# Patient Record
Sex: MALE | Race: WHITE
[De-identification: names, ages, dates, MRNs, and addresses within clinical notes are randomized per-mention and may not be internally consistent; named-entity substitution may affect disease eponyms.]

---

## 2021-06-22 ENCOUNTER — HOSPITAL ENCOUNTER (EMERGENCY)
Dept: HOSPITAL 49 - FER | Age: 51
Discharge: OTHER INSTITUTION | End: 2021-06-22
Payer: COMMERCIAL

## 2021-06-22 DIAGNOSIS — J18.9: ICD-10-CM

## 2021-06-22 DIAGNOSIS — Z88.2: ICD-10-CM

## 2021-06-22 DIAGNOSIS — F17.200: ICD-10-CM

## 2021-06-22 DIAGNOSIS — Z20.822: ICD-10-CM

## 2021-06-22 DIAGNOSIS — N17.9: ICD-10-CM

## 2021-06-22 DIAGNOSIS — A41.9: Primary | ICD-10-CM

## 2021-06-22 LAB
ALBUMIN SERPL-MCNC: 2.4 G/DL (ref 3.4–5)
ALKALINE PHOSHATASE: 44 U/L (ref 46–116)
ALT SERPL-CCNC: 59 U/L (ref 16–63)
AST: 143 U/L (ref 15–37)
BASOPHIL: 1 % (ref 0–2)
BILIRUBIN - TOTAL: 0.3 MG/DL (ref 0.2–1)
BUN SERPL-MCNC: 58 MG/DL (ref 7–18)
BUN/CREAT RATIO (CALC): 8.9 RATIO
CHLORIDE: 93 MMOL/L (ref 98–107)
CO2 (BICARBONATE): 16 MMOL/L (ref 21–32)
CORONAVIRUS 2019 SARS-COV-2: NEGATIVE
CREATININE: 6.5 MG/DL (ref 0.67–1.17)
EOSINOPHIL: 0 % (ref 0–5)
GLOBULIN (CALCULATION): 4.5 G/DL
GLUCOSE SERPL-MCNC: 163 MG/DL (ref 74–106)
HCT: 39.1 % (ref 42–52)
HGB BLD-MCNC: 13.9 G/DL (ref 13.2–18)
INFLUENZA A NAA: NEGATIVE
LACTIC ACID: 7.6 MMOL/L (ref 0.4–1.9)
LYMPHOCYTE: 5.8 % (ref 15–48)
MCH RBC QN AUTO: 33.6 PG (ref 25–31)
MCHC RBC AUTO-ENTMCNC: 35.5 G/DL (ref 32–36)
MCV: 94.4 FL (ref 78–100)
MONOCYTE: 1.8 % (ref 0–12)
MPV: 11 FL (ref 6–9.5)
NEUTROPHIL: 90.6 % (ref 41–80)
NRBC: 0
PLT: 194 K/UL (ref 150–400)
POTASSIUM: 3.3 MMOL/L (ref 3.5–5.1)
RBC MORPHOLOGY: NORMAL
RBC: 4.14 M/UL (ref 4.7–6)
RDW: 14.3 % (ref 11.5–14)
TOTAL PROTEIN: 6.9 G/DL (ref 6.4–8.2)
WBC: 5 K/UL (ref 4–10.5)

## 2021-06-22 PROCEDURE — U0002 COVID-19 LAB TEST NON-CDC: HCPCS

## 2022-01-29 ENCOUNTER — HOSPITAL ENCOUNTER (EMERGENCY)
Dept: HOSPITAL 49 - FER | Age: 52
Discharge: HOME | End: 2022-01-29
Payer: COMMERCIAL

## 2022-01-29 DIAGNOSIS — Z79.899: ICD-10-CM

## 2022-01-29 DIAGNOSIS — R10.32: Primary | ICD-10-CM

## 2022-01-29 DIAGNOSIS — I10: ICD-10-CM
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
## 2022-01-29 DIAGNOSIS — Z88.2: ICD-10-CM
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
## 2022-04-05 ENCOUNTER — HOSPITAL ENCOUNTER (EMERGENCY)
Dept: HOSPITAL 49 - FER | Age: 52
Discharge: HOME | End: 2022-04-05
Payer: COMMERCIAL

## 2022-04-05 DIAGNOSIS — I12.9: ICD-10-CM

## 2022-04-05 DIAGNOSIS — Z88.2: ICD-10-CM

## 2022-04-05 DIAGNOSIS — F17.200: ICD-10-CM

## 2022-04-05 DIAGNOSIS — N18.9: ICD-10-CM

## 2022-04-05 DIAGNOSIS — E11.22: ICD-10-CM

## 2022-04-05 DIAGNOSIS — R30.0: Primary | ICD-10-CM

## 2022-04-05 LAB
BACTERIA: (no result)
BILIRUBIN: NEGATIVE MG/DL
BLOOD: NEGATIVE ERY/UL
CLARITY UR: CLEAR
COLOR: YELLOW
GLUCOSE (U): NORMAL MG/DL
GRAN CASTS #/AREA URNS LPF: (no result) /[LPF]
LEUKOCYTES: NEGATIVE LEU/UL
NITRITE: NEGATIVE MG/DL
PROTEIN: (no result) MG/DL
SPECIFIC GRAVITY: >=1.03 (ref 1–1.03)
SQUAMOUS EPITHELIAL CELLS: (no result)
URINARY RBC: (no result)
URINARY WBC: (no result)
UROBILINOGEN: 0.2 MG/DL (ref 0.2–1)

## 2022-04-14 ENCOUNTER — HOSPITAL ENCOUNTER (EMERGENCY)
Dept: HOSPITAL 49 - FER | Age: 52
Discharge: HOME | End: 2022-04-14
Payer: COMMERCIAL

## 2022-04-14 DIAGNOSIS — M87.9: Primary | ICD-10-CM

## 2022-04-14 DIAGNOSIS — F17.210: ICD-10-CM

## 2022-04-14 DIAGNOSIS — M47.26: ICD-10-CM

## 2022-04-14 DIAGNOSIS — Z88.2: ICD-10-CM

## 2022-04-14 DIAGNOSIS — I10: ICD-10-CM

## 2022-05-15 ENCOUNTER — HOSPITAL ENCOUNTER (EMERGENCY)
Dept: HOSPITAL 49 - FER | Age: 52
Discharge: HOME | End: 2022-05-15
Payer: COMMERCIAL

## 2022-05-15 DIAGNOSIS — F17.210: ICD-10-CM

## 2022-05-15 DIAGNOSIS — M25.552: Primary | ICD-10-CM

## 2022-05-15 DIAGNOSIS — Z79.899: ICD-10-CM

## 2022-05-15 DIAGNOSIS — I10: ICD-10-CM

## 2022-05-15 DIAGNOSIS — Z88.2: ICD-10-CM

## 2022-06-08 ENCOUNTER — HOSPITAL ENCOUNTER (OUTPATIENT)
Dept: HOSPITAL 49 - FAS | Age: 52
Discharge: HOME | End: 2022-06-08
Attending: ORTHOPAEDIC SURGERY
Payer: COMMERCIAL

## 2022-06-08 VITALS — HEIGHT: 68 IN | WEIGHT: 164.99 LBS | BODY MASS INDEX: 25.01 KG/M2

## 2022-06-08 DIAGNOSIS — Z79.899: ICD-10-CM

## 2022-06-08 DIAGNOSIS — Z88.2: ICD-10-CM

## 2022-06-08 DIAGNOSIS — F17.200: ICD-10-CM

## 2022-06-08 DIAGNOSIS — M16.12: Primary | ICD-10-CM

## 2022-06-08 DIAGNOSIS — M87.859: ICD-10-CM

## 2022-06-08 DIAGNOSIS — I10: ICD-10-CM

## 2022-06-08 LAB
BUN SERPL-MCNC: 21 MG/DL (ref 7–18)
BUN/CREAT RATIO (CALC): 24.7 RATIO
CHLORIDE: 106 MMOL/L (ref 98–107)
CO2 (BICARBONATE): 24 MMOL/L (ref 21–32)
CREATININE: 0.85 MG/DL (ref 0.67–1.17)
GLUCOSE SERPL-MCNC: 172 MG/DL (ref 74–106)
POTASSIUM: 3.4 MMOL/L (ref 3.5–5.1)

## 2022-06-19 ENCOUNTER — HOSPITAL ENCOUNTER (EMERGENCY)
Dept: HOSPITAL 49 - FER | Age: 52
Discharge: HOME | End: 2022-06-19
Payer: COMMERCIAL

## 2022-06-19 DIAGNOSIS — N18.9: ICD-10-CM

## 2022-06-19 DIAGNOSIS — F17.210: ICD-10-CM

## 2022-06-19 DIAGNOSIS — M87.9: Primary | ICD-10-CM

## 2022-06-19 DIAGNOSIS — I13.0: ICD-10-CM

## 2022-06-19 DIAGNOSIS — I50.9: ICD-10-CM

## 2022-06-19 DIAGNOSIS — Z79.899: ICD-10-CM

## 2022-07-26 ENCOUNTER — HOSPITAL ENCOUNTER
Dept: HOSPITAL 49 - FAS | Age: 52
LOS: 2 days | Discharge: HOME | End: 2022-07-28
Attending: ORTHOPAEDIC SURGERY | Admitting: ORTHOPAEDIC SURGERY
Payer: COMMERCIAL

## 2022-07-26 VITALS — HEIGHT: 68.11 IN | WEIGHT: 171.06 LBS | BODY MASS INDEX: 25.92 KG/M2

## 2022-07-26 DIAGNOSIS — F17.210: ICD-10-CM

## 2022-07-26 DIAGNOSIS — F32.A: ICD-10-CM

## 2022-07-26 DIAGNOSIS — Z88.2: ICD-10-CM

## 2022-07-26 DIAGNOSIS — I10: ICD-10-CM

## 2022-07-26 DIAGNOSIS — M16.12: Primary | ICD-10-CM

## 2022-07-26 DIAGNOSIS — E11.9: ICD-10-CM

## 2022-07-26 DIAGNOSIS — M87.852: ICD-10-CM

## 2022-07-26 PROCEDURE — 0054T BONE SRGRY CMPTR FLUOR IMAGE: CPT

## 2022-07-26 PROCEDURE — C1776 JOINT DEVICE (IMPLANTABLE): HCPCS

## 2022-07-26 PROCEDURE — G0378 HOSPITAL OBSERVATION PER HR: HCPCS

## 2022-07-26 PROCEDURE — 27130 TOTAL HIP ARTHROPLASTY: CPT

## 2022-07-27 LAB
BASOPHIL: 0.4 % (ref 0–2)
BILIRUBIN: NEGATIVE MG/DL
BLOOD: NEGATIVE ERY/UL
BUN SERPL-MCNC: 18 MG/DL (ref 7–18)
BUN/CREAT RATIO (CALC): 20 RATIO
CHLORIDE: 104 MMOL/L (ref 98–107)
CLARITY UR: CLEAR
CO2 (BICARBONATE): 22 MMOL/L (ref 21–32)
COLOR: YELLOW
CREATININE: 0.9 MG/DL (ref 0.67–1.17)
EOSINOPHIL: 2 % (ref 0–5)
GLUCOSE (U): NORMAL MG/DL
GLUCOSE SERPL-MCNC: 149 MG/DL (ref 74–106)
HCT: 31.7 % (ref 42–52)
HGB BLD-MCNC: 10 G/DL (ref 13.2–18)
LEUKOCYTES: NEGATIVE LEU/UL
LYMPHOCYTE: 10.8 % (ref 15–48)
MCH RBC QN AUTO: 30.4 PG (ref 25–31)
MCHC RBC AUTO-ENTMCNC: 31.5 G/DL (ref 32–36)
MCV: 96.4 FL (ref 78–100)
MONOCYTE: 12.7 % (ref 0–12)
MPV: 9.9 FL (ref 6–9.5)
NEUTROPHIL: 73.7 % (ref 41–80)
NITRITE: NEGATIVE MG/DL
NRBC: 0
PLT: 201 K/UL (ref 150–400)
POTASSIUM: 4.1 MMOL/L (ref 3.5–5.1)
PROTEIN: NEGATIVE MG/DL
RBC MORPHOLOGY: NORMAL
RBC: 3.29 M/UL (ref 4.7–6)
RDW: 15.1 % (ref 11.5–14)
SPECIFIC GRAVITY: 1.02 (ref 1–1.03)
UROBILINOGEN: 0.2 MG/DL (ref 0.2–1)
WBC: 15.5 K/UL (ref 4–10.5)

## 2022-07-27 NOTE — NUR
PT TRANSFERED FROM [facility] - REPORT RECIEVED FROM ILIANA RN - PT ORIENTED TO ROOM-PT
[id number] ABLE TO MAKE WANTS AND NEEDS KNOW. CARDIAC MONITOR ON . HAN RAZO AWARE. RATED PAIN A 10 TO LEFT HIP. HAN CASTILLO AWARE AND SAID TO WAIT ON
ORDERED PAIN PILLS BECAUSE HE JUST RECEIVED 4 MG OF MORPHINE IVP, HAN RAZO MADE AWARE TEMP 100.9 ORAL SAID THAT SHE ORDERED IV ABT BUT TO WAIT UNTIL
HIS BLOOD CULTURES ARE DRAWN.

## 2022-07-28 LAB
BASOPHIL: 0.3 % (ref 0–2)
EOSINOPHIL: 4.4 % (ref 0–5)
HCT: 26.8 % (ref 42–52)
HGB BLD-MCNC: 8.8 G/DL (ref 13.2–18)
LYMPHOCYTE: 6.4 % (ref 15–48)
MCH RBC QN AUTO: 30.1 PG (ref 25–31)
MCHC RBC AUTO-ENTMCNC: 32.8 G/DL (ref 32–36)
MCV: 91.8 FL (ref 78–100)
MONOCYTE: 8.7 % (ref 0–12)
MPV: 9.7 FL (ref 6–9.5)
NEUTROPHIL: 79.7 % (ref 41–80)
NRBC: 0
PLT: 225 K/UL (ref 150–400)
RBC MORPHOLOGY: NORMAL
RBC: 2.92 M/UL (ref 4.7–6)
RDW: 14.9 % (ref 11.5–14)
WBC: 14.9 K/UL (ref 4–10.5)

## 2022-08-23 ENCOUNTER — HOSPITAL ENCOUNTER (EMERGENCY)
Dept: HOSPITAL 49 - FER | Age: 52
LOS: 1 days | Discharge: HOME | End: 2022-08-24
Payer: COMMERCIAL

## 2022-08-23 DIAGNOSIS — Z88.2: ICD-10-CM

## 2022-08-23 DIAGNOSIS — I10: ICD-10-CM

## 2022-08-23 DIAGNOSIS — F17.200: ICD-10-CM

## 2022-08-23 DIAGNOSIS — M25.562: Primary | ICD-10-CM
